# Patient Record
(demographics unavailable — no encounter records)

---

## 2025-04-22 NOTE — PHYSICAL EXAM
[FreeTextEntry1] : PHYSICAL EXAM : OBJECTIVE   GENERAL : Awake ,alert and oriented to time place and person  HEAD : normocephalic and atraumatic  NECK : supple ,no tracheal deviation ,no thyroid enlargement noted with swallowing EYES : sclera and conjunctiva normal no redness,intact extraocular movements  ENT  : ears and nose normal in appearance -hearing adequate  PULMONARY: effort normal. No respiratory distress. breathing regular. No wheezes  LYMPH : No swelling in limbs, capillary return within normal range  CVS : warm extremities,no palpitations,not short of breath, no visible jugular venous distention PSYCH : mood and affect normal ,good eye contact ,normal attention  ABDOMEN : no visible distension ,  NEUROLOGICAL:cranial nerves intact,muscle tone normal,gait and balance safe except where noted below  SKIN : warm and dry No rash detected over specific body areas examined  MUSCULOSKELETAL: normal muscle bulk, no focal bony tenderness /posture normal except where specified below left shoulder IR impingment  neg drop arm test  MMT 5/5 UE  LE knock kneed ++ R>L  feet pronated pes planus  hip ROM full  small bakers cyst both knees

## 2025-04-22 NOTE — ASSESSMENT
[FreeTextEntry1] :   PLAN AND RECOMMENDATIONS :   We discussed differential diagnosis and clinical impression  multiple issues  discussed each one  To help the patient understand their condition a plastic 3D model of the knee was used to better explain. The patient was given handouts to read on this condition,helpful hints ,exercises etc  all questions answered Recommend .symptomatic care and support  medications advise prescription NSAIDS for both pain and anti inflammation  to help with healing / calming the soft tissue and reducing swelling- for at least 2 weeks strict -Naprosyn 500 mg BID after food -warned of possible GI side effects -advised to take with meals or add over the counter Nexium, if sensitive- if GI upset (nausea, vomiting, heartburn) becomes noticeable -stop medication and notify the office.     imaging advise US left shoulder rule out cuff tear  advise xrays knees    referral to PT start with knees then shoulder after us imaging   hydrotherapy /heat / cold for pain  continue  precautions including care with bending, lifting, twisting and  carrying.  relative rest and avoidance of painful activity where possible  increasing activity as discussed  return for follow up. after imaging- 2 weeks  The patient had the opportunity to ask questions and all were answered to their satisfaction. We will coordinate treatment with the other members of the treatment team. The patient verbalized understanding of the management/plan rationale and agreed with my recommendations. Total clinician time on the date of this encounter was at least 65 minutes- including  1 preparing to see the patient and review of prior notes, tests and other records  2 obtaining and reviewing and/ or confirming the history 3 performing a medically necessary, pertinent  and appropriate examination  4 ordering medications and supplements explaining side effects to look for ,tests,procedures,therapy and or specialty referrals 5 explaining the diagnosis or differential to the patient  6 communicating with other physicians or providers before during or after the visit. note sent to PCP   will discuss weight loss next visit even 5 lbs will help with her knee pain -currently BMI 31

## 2025-04-22 NOTE — REVIEW OF SYSTEMS
[Patient Intake Form Reviewed] : Patient intake form was reviewed [Joint Pain] : joint pain [Joint Stiffness] : joint stiffness [Fever] : no fever [Chills] : no chills [Fatigue] : no fatigue [Muscle Weakness] : no muscle weakness [Difficulty Walking] : no difficulty walking

## 2025-04-22 NOTE — REASON FOR VISIT
[Initial Evaluation] : an initial evaluation [FreeTextEntry1] : for neck and left shoulder pain referred by Dr. Chamorro

## 2025-04-22 NOTE — CONSULT LETTER
[FreeTextEntry1] : Dear Dr. DAMON   I had the pleasure of evaluating your patient, CRISTIANE KIDD .  Thank you very much for allowing me to participate in the care of this patient. If you have any questions, please do not hesitate to contact me.   Sincerely,  Lev Walker MD   ABPMR Board Certified in Physical Medicine and Rehabilitation Certified Fellow of AANEM (Neuromuscular and Electrodiagnostic Medicine) Subspecialty certified in Sports Medicine (ABPMR)   of Physical Medicine and Rehabilitation [facility] [employer] [facility]

## 2025-04-22 NOTE — HISTORY OF PRESENT ILLNESS
[FreeTextEntry1] :  Ms. CRISTIANE KIDD is a very pleasant RHD 45-year female who comes in for evaluation of neck and left shoulder pain that has been ongoing for over 1 month without any specific injury or inciting event but noted after carrying something heavy. The pain is located primarily neck and left shoulder intermittent in nature and described as dull, sharp.  The pain is rated as 4/10 during today's visit, and ranges from 4-8/10. The patient's symptoms are aggravated by certain movement, not stretching and alleviated by ice, heat. The patient denies any night pain, numbness/tingling, weakness, or bowel/bladder dysfunction.   The patient has other complaints at this time - pain started from R hip going down to her toes that has been ongoing many years. She was limping before Covid, after exercise she is better - stretching helps a lot. she has flat feet and rolls her feet -tends to over pronate knees ache    The patient has previously tried acupuncture which little help.     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